# Patient Record
Sex: MALE | Race: WHITE | URBAN - METROPOLITAN AREA
[De-identification: names, ages, dates, MRNs, and addresses within clinical notes are randomized per-mention and may not be internally consistent; named-entity substitution may affect disease eponyms.]

---

## 2024-10-21 ENCOUNTER — OFFICE VISIT (OUTPATIENT)
Age: 34
End: 2024-10-21

## 2024-10-21 VITALS
BODY MASS INDEX: 32.51 KG/M2 | DIASTOLIC BLOOD PRESSURE: 84 MMHG | SYSTOLIC BLOOD PRESSURE: 130 MMHG | TEMPERATURE: 97.9 F | RESPIRATION RATE: 19 BRPM | OXYGEN SATURATION: 98 % | HEART RATE: 99 BPM | HEIGHT: 72 IN | WEIGHT: 240 LBS

## 2024-10-21 DIAGNOSIS — T14.8XXA MUSCLE STRAIN: Primary | ICD-10-CM

## 2024-10-21 DIAGNOSIS — R07.81 RIB PAIN ON LEFT SIDE: ICD-10-CM

## 2024-10-21 RX ORDER — IBUPROFEN 800 MG/1
800 TABLET, FILM COATED ORAL EVERY 6 HOURS PRN
Qty: 40 TABLET | Refills: 0 | Status: SHIPPED | OUTPATIENT
Start: 2024-10-21 | End: 2024-10-31

## 2024-10-21 ASSESSMENT — ENCOUNTER SYMPTOMS
WHEEZING: 0
COUGH: 1
SHORTNESS OF BREATH: 1
BACK PAIN: 1
CHEST TIGHTNESS: 1
NAUSEA: 0

## 2024-10-21 NOTE — PROGRESS NOTES
Juan Pablo Earl (:  1990) is a 34 y.o. male,New patient, here for evaluation of the following chief complaint(s):  Cough (Dry cough.Sxs started last night.)         Assessment & Plan  Muscle strain     Take ibuprofen as needed for pain  You can use a heating pad as well to help relax the muscle    Orders:    ibuprofen (ADVIL;MOTRIN) 800 MG tablet; Take 1 tablet by mouth every 6 hours as needed for Pain    Rib pain on left side              No follow-ups on file.       Subjective   Pt c/o dry cough, chills/sweats, fatigue, dizziness, lightheadedness, sob \"feels like I can't a whole breath in when I inhale and tightness when I breathe a little bit,\" left side pain and had heart surgery not even a year ago where he had fluid on his heart and nervous it could be that; started yesterday afternoon and states he was \"wrestling\" with his girlfriend when he noticed it happened; thinks he could have pulled a muscle since that's when the pain started      History provided by:  Patient  Cough  Associated symptoms include chills and shortness of breath. Pertinent negatives include no chest pain, headaches or wheezing.       Review of Systems   Constitutional:  Positive for chills and fatigue.   Respiratory:  Positive for cough, chest tightness and shortness of breath. Negative for wheezing.    Cardiovascular:  Negative for chest pain.   Gastrointestinal:  Negative for nausea.   Musculoskeletal:  Positive for back pain.   Neurological:  Positive for dizziness and light-headedness. Negative for headaches.          Objective   Physical Exam  Vitals reviewed.   Constitutional:       Appearance: Normal appearance.   Cardiovascular:      Rate and Rhythm: Normal rate and regular rhythm.      Heart sounds: Normal heart sounds.   Pulmonary:      Effort: Pulmonary effort is normal.      Breath sounds: Normal breath sounds.   Musculoskeletal:         General: Tenderness present.      Thoracic back: Tenderness present. No